# Patient Record
Sex: MALE | Race: OTHER | Employment: FULL TIME | URBAN - METROPOLITAN AREA
[De-identification: names, ages, dates, MRNs, and addresses within clinical notes are randomized per-mention and may not be internally consistent; named-entity substitution may affect disease eponyms.]

---

## 2017-03-31 ENCOUNTER — OFFICE VISIT (OUTPATIENT)
Dept: FAMILY MEDICINE CLINIC | Facility: CLINIC | Age: 47
End: 2017-03-31

## 2017-03-31 VITALS
DIASTOLIC BLOOD PRESSURE: 92 MMHG | HEART RATE: 71 BPM | WEIGHT: 183 LBS | HEIGHT: 65 IN | BODY MASS INDEX: 30.49 KG/M2 | SYSTOLIC BLOOD PRESSURE: 145 MMHG

## 2017-03-31 DIAGNOSIS — L80 VITILIGO: ICD-10-CM

## 2017-03-31 DIAGNOSIS — K40.90 INGUINAL HERNIA, LEFT: ICD-10-CM

## 2017-03-31 DIAGNOSIS — B35.6 TINEA CRURIS: Primary | ICD-10-CM

## 2017-03-31 PROCEDURE — 99212 OFFICE O/P EST SF 10 MIN: CPT | Performed by: FAMILY MEDICINE

## 2017-03-31 PROCEDURE — 99201 OFFICE/OUTPT VISIT,NEW,LEVL I: CPT | Performed by: FAMILY MEDICINE

## 2017-03-31 RX ORDER — CLOTRIMAZOLE AND BETAMETHASONE DIPROPIONATE 10; .64 MG/G; MG/G
CREAM TOPICAL
Qty: 45 G | Refills: 3 | Status: SHIPPED | OUTPATIENT
Start: 2017-03-31 | End: 2018-06-28 | Stop reason: ALTCHOICE

## 2017-04-28 ENCOUNTER — APPOINTMENT (OUTPATIENT)
Dept: LAB | Age: 47
End: 2017-04-28
Attending: FAMILY MEDICINE
Payer: COMMERCIAL

## 2017-04-28 ENCOUNTER — OFFICE VISIT (OUTPATIENT)
Dept: FAMILY MEDICINE CLINIC | Facility: CLINIC | Age: 47
End: 2017-04-28

## 2017-04-28 VITALS
HEIGHT: 65 IN | HEART RATE: 57 BPM | BODY MASS INDEX: 30.62 KG/M2 | TEMPERATURE: 98 F | SYSTOLIC BLOOD PRESSURE: 124 MMHG | WEIGHT: 183.81 LBS | DIASTOLIC BLOOD PRESSURE: 81 MMHG

## 2017-04-28 DIAGNOSIS — K64.9 HEMORRHOIDS, UNSPECIFIED HEMORRHOID TYPE: ICD-10-CM

## 2017-04-28 DIAGNOSIS — B35.1 TINEA UNGUIUM: ICD-10-CM

## 2017-04-28 DIAGNOSIS — K40.90 INGUINAL HERNIA, LEFT: ICD-10-CM

## 2017-04-28 DIAGNOSIS — Z00.00 ANNUAL PHYSICAL EXAM: ICD-10-CM

## 2017-04-28 DIAGNOSIS — L80 VITILIGO: ICD-10-CM

## 2017-04-28 DIAGNOSIS — Z00.00 ANNUAL PHYSICAL EXAM: Primary | ICD-10-CM

## 2017-04-28 PROCEDURE — 80061 LIPID PANEL: CPT

## 2017-04-28 PROCEDURE — 99396 PREV VISIT EST AGE 40-64: CPT | Performed by: FAMILY MEDICINE

## 2017-04-28 PROCEDURE — G0438 PPPS, INITIAL VISIT: HCPCS | Performed by: FAMILY MEDICINE

## 2017-04-28 PROCEDURE — 36415 COLL VENOUS BLD VENIPUNCTURE: CPT

## 2017-04-28 PROCEDURE — 82947 ASSAY GLUCOSE BLOOD QUANT: CPT

## 2017-04-28 NOTE — PROGRESS NOTES
Physical      Patient's past medical surgical family social history was reviewed. Review of Systems  Allergic: no environmental allergies or food allergies  Cardiovascular: no chest pain, irregular heartbeat, or palpitations.   Constitutional: no fever o Future  - Glucose, Serum [E]; Future  - Lipid Panel [E]; Future    2. Vitiligo  Refer derm  - Derm Referral - [place] ([place])    3. Tinea unguium  Refer derm  - Derm Referral - [place] ([place])    4.  Inguinal hernia, left  nontender and small  Will ho

## 2017-05-16 ENCOUNTER — TELEPHONE (OUTPATIENT)
Dept: FAMILY MEDICINE CLINIC | Facility: CLINIC | Age: 47
End: 2017-05-16

## 2017-05-16 DIAGNOSIS — E78.00 ELEVATED CHOLESTEROL: ICD-10-CM

## 2017-05-16 DIAGNOSIS — R73.03 PREDIABETES: Primary | ICD-10-CM

## 2017-05-16 NOTE — TELEPHONE ENCOUNTER
Patient was left a message to call back. Transfer to  first, then [id number] if I'm unavailable.     Will mail lab letter and dietician referral.

## 2017-05-16 NOTE — TELEPHONE ENCOUNTER
----- Message from Noah Pedro DO sent at 5/2/2017  8:02 AM CDT -----  Cholesterol and blood sugar were both elevated. PSA test was normal.  Send him to dietitian ×2 visits diagnosis  prediabetes and high cholesterol.

## 2017-05-16 NOTE — TELEPHONE ENCOUNTER
With Efrem Walter with patient ( verified name and ), reviewed information, patient verbalized understanding and agrees with plan. Number given to schedule appointment. No further questions or concerns at this time.

## 2017-06-26 ENCOUNTER — HOSPITAL ENCOUNTER (OUTPATIENT)
Dept: NUTRITION | Facility: HOSPITAL | Age: 47
Discharge: HOME | End: 2017-06-26
Attending: FAMILY MEDICINE
Payer: COMMERCIAL

## 2017-06-26 PROCEDURE — 97802 MEDICAL NUTRITION INDIV IN: CPT | Performed by: DIETITIAN, REGISTERED

## 2017-06-26 NOTE — PROGRESS NOTES
Nutrition Assessment    Yevgeniy Carlos is a 48year old male. Referred by:  Attending  Referring Physician Name: [facility] Comment: Prediabetes, Hypercholesterolemia  Visit Information: Initial Visit 6 Limit eating out to decrease high fat foods and excess calories  4. Limit fresh made juice to 1/2-1 cup per day  5.  Increase activity to 30- 45 minutes most days of the week    Recommendation to MD: Recommend patient recheck lipid panel and fast glucose in

## 2018-06-28 ENCOUNTER — OFFICE VISIT (OUTPATIENT)
Dept: FAMILY MEDICINE CLINIC | Facility: CLINIC | Age: 48
End: 2018-06-28

## 2018-06-28 ENCOUNTER — HOSPITAL ENCOUNTER (OUTPATIENT)
Dept: GENERAL RADIOLOGY | Age: 48
Discharge: HOME | End: 2018-06-28
Attending: FAMILY MEDICINE
Payer: COMMERCIAL

## 2018-06-28 VITALS
HEART RATE: 68 BPM | WEIGHT: 174.81 LBS | DIASTOLIC BLOOD PRESSURE: 77 MMHG | BODY MASS INDEX: 29 KG/M2 | SYSTOLIC BLOOD PRESSURE: 123 MMHG

## 2018-06-28 DIAGNOSIS — R30.0 DYSURIA: ICD-10-CM

## 2018-06-28 DIAGNOSIS — R30.0 DYSURIA: Primary | ICD-10-CM

## 2018-06-28 PROCEDURE — 74018 RADEX ABDOMEN 1 VIEW: CPT | Performed by: FAMILY MEDICINE

## 2018-06-28 PROCEDURE — 99212 OFFICE O/P EST SF 10 MIN: CPT | Performed by: FAMILY MEDICINE

## 2018-06-28 PROCEDURE — 81002 URINALYSIS NONAUTO W/O SCOPE: CPT | Performed by: FAMILY MEDICINE

## 2018-06-28 PROCEDURE — 99213 OFFICE O/P EST LOW 20 MIN: CPT | Performed by: FAMILY MEDICINE

## 2018-06-28 RX ORDER — DOXYCYCLINE 100 MG/1
100 CAPSULE ORAL 2 TIMES DAILY
Qty: 20 CAPSULE | Refills: 0 | Status: SHIPPED | OUTPATIENT
Start: 2018-06-28

## 2018-06-28 NOTE — PROGRESS NOTES
Cary Meyers is a 50year old male. Patient presents with:  Back Pain  Dysuria  Shoulder Pain    HPI:   Reports mild pain in lower abdomen since 4 days ago and reports having burning with urination also.   When he feels the pain in his back, he has a bit

## 2018-06-29 NOTE — PROGRESS NOTES
Tests are all normal. X-ray was normal so acute stones.  Pain could be muscular related also  - can take ibuprofen 3 tablets every 8 hours and finish the antibiotics

## 2018-07-05 ENCOUNTER — TELEPHONE (OUTPATIENT)
Dept: OTHER | Age: 48
End: 2018-07-05

## 2018-07-05 NOTE — TELEPHONE ENCOUNTER
LMTCB. Transfer to [id number].    ----- Message from Jocelyn Yang sent at 7/2/2018 11:01 AM CDT -----  rn please address    Notes recorded by Joey Gutierrez MD on 6/29/2018 at 9:46 AM CDT  Tests are all normal. X-ray was normal so acute stones.  Pain could

## 2018-07-05 NOTE — TELEPHONE ENCOUNTER
Pt returned call, verified name and  with [employer] assistance agent # [id number]. Reviewed test results and recommendations per doctor's instructions. Pt agreed with plan of care and will call back if no improvement in symptoms.

## 2023-03-31 NOTE — PROGRESS NOTES
Has more sweating during winter. Has vitiligo  Uses lotrimin and it helps    Exam  Vitiligo  erythema left and rt scrotum  Has small non tender reducible inguinal hernia left. A/p  Tinea cruris  Cream f/u for physical  Hernia  No tx needed now.
negative

## (undated) NOTE — Clinical Note
May 16, 2017    [address] [address]      Dear Jaci Gordon: The following are the results of your recent tests. Please review the list of test results.   Your result is the value on the left; we have also supplied the

## (undated) NOTE — MR AVS SNAPSHOT
[facility] [address]  [address], [address]  [address] [facility]  [phone]               Thank you for choosing us for your health care visit with Deandra Abbott DO.   We are glad to serve you and happy to provide you with this prather information, go to [email or web address] and click on the Sign Up Now link in the [employer] box. Enter your [email or web address] Activation Code exactly as it appears below along with your Zip Code and Date of Birth to complete the sign-up process.  If you do Make half your plate fruits and vegetables Highly refined, white starches including white bread, rice and pasta   Eat plenty of protein, keep the fat content low Sugars:  sodas and sports drinks, candies and desserts   Eat plenty of low-fat dairy products

## (undated) NOTE — MR AVS SNAPSHOT
[facility] [address]  [address]  [address] [facility]  [phone]               Thank you for choosing us for your health care visit with Kelli Adames. DO Mil[place].   We are glad to serve you and happy to provide you with this prather authorization, such as [place], please feel free to schedule your appointment immediately. However, if you are unsure about the requirements for authorization, please wait 5-7 days and then contact your physician's   office.  At that time, you puma If you have questions, you can call [phone] to talk to our [facility] Staff. Remember, [facility] is NOT to be used for urgent needs. For medical emergencies, dial 911.            Visit [facility] online at  [email or web address]